# Patient Record
Sex: MALE | Race: WHITE | Employment: FULL TIME | URBAN - METROPOLITAN AREA
[De-identification: names, ages, dates, MRNs, and addresses within clinical notes are randomized per-mention and may not be internally consistent; named-entity substitution may affect disease eponyms.]

---

## 2017-04-08 ENCOUNTER — APPOINTMENT (OUTPATIENT)
Dept: GENERAL RADIOLOGY | Age: 62
End: 2017-04-08
Attending: EMERGENCY MEDICINE
Payer: COMMERCIAL

## 2017-04-08 ENCOUNTER — HOSPITAL ENCOUNTER (EMERGENCY)
Age: 62
Discharge: HOME | End: 2017-04-08
Attending: EMERGENCY MEDICINE | Admitting: EMERGENCY MEDICINE
Payer: COMMERCIAL

## 2017-04-08 VITALS
OXYGEN SATURATION: 91 % | DIASTOLIC BLOOD PRESSURE: 67 MMHG | SYSTOLIC BLOOD PRESSURE: 95 MMHG | HEIGHT: 71 IN | HEART RATE: 82 BPM | RESPIRATION RATE: 14 BRPM | WEIGHT: 200 LBS | BODY MASS INDEX: 28 KG/M2 | TEMPERATURE: 98 F

## 2017-04-08 DIAGNOSIS — M25.552 PAIN OF BOTH HIP JOINTS: Primary | ICD-10-CM

## 2017-04-08 DIAGNOSIS — M25.551 PAIN OF BOTH HIP JOINTS: Primary | ICD-10-CM

## 2017-04-08 PROCEDURE — 74011250636 HC RX REV CODE- 250/636: Performed by: EMERGENCY MEDICINE

## 2017-04-08 PROCEDURE — 96372 THER/PROPH/DIAG INJ SC/IM: CPT

## 2017-04-08 PROCEDURE — 72170 X-RAY EXAM OF PELVIS: CPT

## 2017-04-08 PROCEDURE — 99284 EMERGENCY DEPT VISIT MOD MDM: CPT

## 2017-04-08 RX ORDER — MORPHINE SULFATE 4 MG/ML
6 INJECTION, SOLUTION INTRAMUSCULAR; INTRAVENOUS
Status: COMPLETED | OUTPATIENT
Start: 2017-04-08 | End: 2017-04-08

## 2017-04-08 RX ORDER — NAPROXEN 500 MG/1
500 TABLET ORAL 2 TIMES DAILY WITH MEALS
Qty: 20 TAB | Refills: 0 | Status: SHIPPED | OUTPATIENT
Start: 2017-04-08 | End: 2017-04-18

## 2017-04-08 RX ORDER — HYDROCODONE BITARTRATE AND ACETAMINOPHEN 5; 325 MG/1; MG/1
1 TABLET ORAL
Qty: 8 TAB | Refills: 0 | Status: SHIPPED | OUTPATIENT
Start: 2017-04-08

## 2017-04-08 RX ADMIN — Medication 6 MG: at 01:43

## 2017-04-08 NOTE — ED TRIAGE NOTES
Pt says he started today to have pelvic and hip pain, feels like his legs are not working well. No known injury.

## 2017-04-08 NOTE — ED PROVIDER NOTES
HPI Comments: 64 y.o. male with past medical history significant for colon polyps who presents from home with chief complaint of hip pain. Pt complains of pelvic and tailbone soreness that started yesterday morning with increasing sharp bilateral hip pain that started yesterday afternoon. Pt states that he was unable to walk due to the pain in his hips by 4PM. Pt also complains of some lower back pain and chills secondary to pain. Pt reports that he used to have mild tailbone soreness in the past if he sits in metal chairs, but never pain of this severity. Pt states that he had surgery scheduled to remove \"a piece of his colon due to polyps\" twelve days ago, but his surgeon decided to not proceed with the surgery after a laparoscopy. Pt reports abdominal cramping and diarrhea over the past weekend. Pt's wife states that pt took Advil PM right before presenting to the ED. Pt denies any injury to his hips. Pt also denies any weakness, numbness, tingling or color change of his legs. Pt also denies any bowel movement changes, urinary symptoms, incontinence, fever, nausea, vomiting or diarrhea. Pt states that he does not have a history of arthritis. There are no other acute medical concerns at this time. PCP: Kalpesh Ramirez MD    Note written by Marie Christie, as dictated by Christopher Lewis. Momo Espinal MD 12:53 AM         The history is provided by the patient and the spouse. No  was used. History reviewed. No pertinent past medical history. Past Surgical History:   Procedure Laterality Date    HX GI      polyps         History reviewed. No pertinent family history. Social History     Social History    Marital status:      Spouse name: N/A    Number of children: N/A    Years of education: N/A     Occupational History    Not on file.      Social History Main Topics    Smoking status: Never Smoker    Smokeless tobacco: Not on file    Alcohol use No    Drug use: No    Sexual activity: Not on file     Other Topics Concern    Not on file     Social History Narrative    No narrative on file         ALLERGIES: Review of patient's allergies indicates no known allergies. Review of Systems   Constitutional: Positive for chills. Negative for fever. HENT: Negative for ear pain and sore throat. Eyes: Negative for pain. Respiratory: Negative for chest tightness and shortness of breath. Cardiovascular: Negative for chest pain and leg swelling. Gastrointestinal: Negative for constipation, nausea and vomiting. Abdominal pain: resolved. Diarrhea: resolved. Genitourinary: Negative for difficulty urinating, dysuria and flank pain. Musculoskeletal: Positive for arthralgias, back pain and gait problem. Skin: Negative for color change and rash. Neurological: Negative for weakness, numbness and headaches. All other systems reviewed and are negative. Vitals:    04/08/17 0021 04/08/17 0031   BP: 103/63    Pulse: 86    Resp: 20    Temp: 98 °F (36.7 °C)    SpO2: 94% 95%   Weight: 90.7 kg (200 lb)    Height: 5' 11\" (1.803 m)             Physical Exam   Constitutional: He appears well-developed and well-nourished. No distress. HENT:   Head: Normocephalic and atraumatic. Eyes: Pupils are equal, round, and reactive to light. No scleral icterus. Neck: Normal range of motion. Neck supple. No tracheal deviation present. Cardiovascular: Normal rate, regular rhythm and normal heart sounds. Exam reveals no gallop and no friction rub. No murmur heard. 2+ pedal pulses   Pulmonary/Chest: Effort normal and breath sounds normal. No respiratory distress. He has no wheezes. He has no rales. Abdominal: Soft. He exhibits no distension. There is no tenderness. There is no rebound and no guarding. Musculoskeletal: He exhibits no edema. Pain with internal rotation of bilateral hips. Neurological: He is alert. Normal motor and sensation of lower extremities.  Normal reflexes. Skin: Skin is warm and dry. Psychiatric: He has a normal mood and affect. Nursing note and vitals reviewed. Note written by Marie Walden, as dictated by Rahul Pritchard. Ayana Lee MD 12:53 AM        MDM  Number of Diagnoses or Management Options  Pain of both hip joints:   Diagnosis management comments: 64year old male presents with atraumatic bilateral hip pain. X-ray of pelvis unremarkable. Treated with morphine with complete relief. Able to ambulate in the ED without difficulty.     A/P: hip pain- f/u with PCP     Patient Progress  Patient progress: resolved    ED Course       Procedures

## 2017-04-08 NOTE — DISCHARGE INSTRUCTIONS
Hip Pain: Care Instructions  Your Care Instructions  Hip pain may be caused by many things, including overuse, a fall, or a twisting movement. Another cause of hip pain is arthritis. Your pain may increase when you stand up, walk, or squat. The pain may come and go or may be constant. Home treatment can help relieve hip pain, swelling, and stiffness. If your pain is ongoing, you may need more tests and treatment. Follow-up care is a key part of your treatment and safety. Be sure to make and go to all appointments, and call your doctor if you are having problems. Its also a good idea to know your test results and keep a list of the medicines you take. How can you care for yourself at home? · Take pain medicines exactly as directed. ¨ If the doctor gave you a prescription medicine for pain, take it as prescribed. ¨ If you are not taking a prescription pain medicine, ask your doctor if you can take an over-the-counter medicine. · Rest and protect your hip. Take a break from any activity, including standing or walking, that may cause pain. · Put ice or a cold pack against your hip for 10 to 20 minutes at a time. Try to do this every 1 to 2 hours for the next 3 days (when you are awake) or until the swelling goes down. Put a thin cloth between the ice and your skin. · Sleep on your healthy side with a pillow between your knees, or sleep on your back with pillows under your knees. · If there is no swelling, you can put moist heat, a heating pad, or a warm cloth on your hip. Do gentle stretching exercises to help keep your hip flexible. · Learn how to prevent falls. Have your vision and hearing checked regularly. Wear slippers or shoes with a nonskid sole. · Stay at a healthy weight. · Wear comfortable shoes. When should you call for help? Call 911 anytime you think you may need emergency care. For example, call if:  · You have sudden chest pain and shortness of breath, or you cough up blood.   · You are not able to stand or walk or bear weight. · Your buttocks, legs, or feet feel numb or tingly. · Your leg or foot is cool or pale or changes color. · You have severe pain. Call your doctor now or seek immediate medical care if:  · You have signs of infection, such as:  ¨ Increased pain, swelling, warmth, or redness in the hip area. ¨ Red streaks leading from the hip area. ¨ Pus draining from the hip area. ¨ A fever. · You have signs of a blood clot, such as:  ¨ Pain in your calf, back of the knee, thigh, or groin. ¨ Redness and swelling in your leg or groin. · You are not able to bend, straighten, or move your leg normally. · You have trouble urinating or having bowel movements. Watch closely for changes in your health, and be sure to contact your doctor if:  · You do not get better as expected. Where can you learn more? Go to http://kelli-sasha.info/. Enter X889 in the search box to learn more about \"Hip Pain: Care Instructions. \"  Current as of: May 27, 2016  Content Version: 11.2  © 7240-9234 [employer]. Care instructions adapted under license by [employer] (which disclaims liability or warranty for this information). If you have questions about a medical condition or this instruction, always ask your healthcare professional. [place] [address] any warranty or liability for your use of this information. We hope that we have addressed all of your medical concerns. The examination and treatment you received in the Emergency Department were for an emergent problem and were not intended as complete care. It is important that you follow up with your healthcare provider(s) for ongoing care. If your symptoms worsen or do not improve as expected, and you are unable to reach your usual health care provider(s), you should return to the Emergency Department.       Today's healthcare is undergoing tremendous change, and patient satisfaction surveys are one of the many tools to assess the quality of medical care. You may receive a survey from the [employer] regarding your experience in the Emergency Department. I hope that your experience has been completely positive, particularly the medical care that I provided. As such, please participate in the survey; anything less than excellent does not meet my expectations or intentions. [facility] and [facility] participate in nationally recognized quality of care measures. If your blood pressure is greater than 120/80, as reported below, we urge that you seek medical care to address the potential of high blood pressure, commonly known as hypertension. Hypertension can be hereditary or can be caused by certain medical conditions, pain, stress, or \"white coat syndrome. \"       Please make an appointment with your health care provider(s) for follow up of your Emergency Department visit. VITALS:   Patient Vitals for the past 8 hrs:   Temp Pulse Resp BP SpO2   04/08/17 0230 - - - 95/67 91 %   04/08/17 0145 - 82 14 110/71 93 %   04/08/17 0129 - - - - 92 %   04/08/17 0124 - - - - 92 %   04/08/17 0031 - - - - 95 %   04/08/17 0021 98 °F (36.7 °C) 86 20 103/63 94 %          Thank you for allowing us to provide you with medical care today. We realize that you have many choices for your emergency care needs. Please choose us in the future for any continued health care needs. Patti Segundo [place], [address] [facility]: [phone]            No results found for this or any previous visit (from the past 24 hour(s)). Xr Pelv Ap Only    Result Date: 4/8/2017  EXAM:  XR PELV AP ONLY INDICATION:  Pelvic and hip pain today. COMPARISON: None. TECHNIQUE: Frontal view of the pelvis. FINDINGS: There is no acute fracture or dislocation. The soft tissues are unremarkable. IMPRESSION: No acute process.

## 2022-04-05 ENCOUNTER — OFFICE VISIT (OUTPATIENT)
Dept: URBAN - METROPOLITAN AREA CLINIC 107 | Facility: CLINIC | Age: 67
End: 2022-04-05
Payer: MEDICARE

## 2022-04-05 ENCOUNTER — WEB ENCOUNTER (OUTPATIENT)
Dept: URBAN - METROPOLITAN AREA CLINIC 107 | Facility: CLINIC | Age: 67
End: 2022-04-05

## 2022-04-05 VITALS
WEIGHT: 194 LBS | BODY MASS INDEX: 27.16 KG/M2 | HEART RATE: 65 BPM | DIASTOLIC BLOOD PRESSURE: 76 MMHG | HEIGHT: 71 IN | SYSTOLIC BLOOD PRESSURE: 113 MMHG | RESPIRATION RATE: 18 BRPM | TEMPERATURE: 98 F

## 2022-04-05 DIAGNOSIS — R10.31 RLQ ABDOMINAL PAIN: ICD-10-CM

## 2022-04-05 PROCEDURE — 99204 OFFICE O/P NEW MOD 45 MIN: CPT | Performed by: INTERNAL MEDICINE

## 2022-04-05 RX ORDER — CHOLECALCIFEROL (VITAMIN D3) 50 MCG
1 TABLET TABLET ORAL ONCE A DAY
Status: ACTIVE | COMMUNITY

## 2022-04-05 RX ORDER — MULTIVITAMIN
1 TABLET TABLET ORAL ONCE A DAY
Status: ACTIVE | COMMUNITY

## 2022-04-05 NOTE — EXAM-PHYSICAL EXAM
He is alert and oriented to person place and situation no acute distress.  Neck is supple without adenopathy or thyromegaly.  Chest is clear to auscultation.  Heart reveals a regular rate and rhythm without gallops rubs or murmurs.  Abdomen is soft nondistended with some mild right lower quadrant tenderness and in this area he does have what appears to be palpable loops of bowel.  Extremities without clubbing cyanosis or edema.

## 2022-04-05 NOTE — HPI-TODAY'S VISIT:
The patient is a very pleasant 66-year-old gentleman referred.  He was referred for multiple gastrointestinal complaints.  I reviewed medical records sent by referring physician. The patient states he comes in now with a postoperative problem.  The patient states that for many years he has had a recurrent polyp and what sounds like his cecum that could never be eradicated.  It evidently grew to a point that it required a right hemicolectomy that was done in October 2021.  He states since the operation he has developed a problem that has been slowly progressive of postprandial abdominal pain primarily that starts in the right lower quadrant and then moves across the lower abdomen.  It usually resolves overnight and then resumes the next morning when he eats again.  His bowel movements are normal.  There is been no weight loss.  There is no nausea or vomiting.  He states he has had multiple imaging studies locally at the urology center in regards to a large kidney stone that required lithotripsy.  He states the urologist told him he had a "old liver" on CT scan.  The patient states there is no blood in his stool.  He states he is scheduled for colonoscopy next week in [facility] by his primary gastroenterologist there.  He states he is also scheduled to see his colorectal surgeon next week for routine postop follow-up.  There is an extensive family history of colon polyps in that his daughter has had a polyposis syndrome and required partial colectomy.

## 2022-04-19 ENCOUNTER — OFFICE VISIT (OUTPATIENT)
Dept: URBAN - METROPOLITAN AREA CLINIC 113 | Facility: CLINIC | Age: 67
End: 2022-04-19
Payer: MEDICARE

## 2022-04-19 VITALS
HEIGHT: 71 IN | WEIGHT: 194 LBS | HEART RATE: 61 BPM | TEMPERATURE: 97.7 F | BODY MASS INDEX: 27.16 KG/M2 | SYSTOLIC BLOOD PRESSURE: 113 MMHG | DIASTOLIC BLOOD PRESSURE: 73 MMHG

## 2022-04-19 DIAGNOSIS — Z86.010 HISTORY OF COLON POLYPS: ICD-10-CM

## 2022-04-19 DIAGNOSIS — R10.31 RLQ ABDOMINAL PAIN: ICD-10-CM

## 2022-04-19 PROBLEM — 428283002: Status: ACTIVE | Noted: 2022-04-19

## 2022-04-19 PROCEDURE — 99213 OFFICE O/P EST LOW 20 MIN: CPT | Performed by: INTERNAL MEDICINE

## 2022-04-19 RX ORDER — CHOLECALCIFEROL (VITAMIN D3) 50 MCG
1 TABLET TABLET ORAL ONCE A DAY
Status: ACTIVE | COMMUNITY

## 2022-04-19 RX ORDER — MULTIVITAMIN
1 TABLET TABLET ORAL ONCE A DAY
Status: ACTIVE | COMMUNITY

## 2022-04-19 NOTE — EXAM-PHYSICAL EXAM
He is alert and oriented to person place and situation no acute distress.  Abdomen is soft nondistended with some minimal right lower quadrant tenderness to deep palpation.  No significant hernias are noted.

## 2022-04-19 NOTE — HPI-TODAY'S VISIT:
The patient is a very pleasant 66-year-old gentleman referred.  He was referred for multiple gastrointestinal complaints.  I reviewed medical records sent by referring physician. The patient states he comes in now with a postoperative problem.  The patient states that for many years he has had a recurrent polyp and what sounds like his cecum that could never be eradicated.  It evidently grew to a point that it required a right hemicolectomy that was done in October 2021.  He states since the operation he has developed a problem that has been slowly progressive of postprandial abdominal pain primarily that starts in the right lower quadrant and then moves across the lower abdomen.  It usually resolves overnight and then resumes the next morning when he eats again.  His bowel movements are normal.  There is been no weight loss.  There is no nausea or vomiting.  He states he has had multiple imaging studies locally at the urology center in regards to a large kidney stone that required lithotripsy.  He states the urologist told him he had a "old liver" on CT scan.  The patient states there is no blood in his stool.  He states he is scheduled for colonoscopy next week in [facility] by his primary gastroenterologist there.  He states he is also scheduled to see his colorectal surgeon next week for routine postop follow-up.  There is an extensive family history of colon polyps in that his daughter has had a polyposis syndrome and required partial colectomy. Interval history.  I reviewed colonoscopy report from outside physician performed April 11.  This was an unremarkable exam.  Anastomosis was intact and patent according to the colonoscopy report.  Diverticulosis was seen in the sigmoid colon. I also reviewed extensive op and path reports sent by the patient.  Colonoscopy in 2017 for unresectable polyp did not reveal any polyp at that time.  Repeat colonoscopy in April 2018 revealed a flat polyp in the ascending colon removed via snare polypectomy.  Repeat colonoscopy in 2019 once again revealed a recurring polyp in the ascending colon.  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  Patient had repeat colonoscopy in 2020.  Once again revealed a polyp in the ascending colon that was once again removed.  This time it returned as a villous adenoma with no high-grade dysplasia.  Patient had another colonoscopy in April 2021.  At the ileocecal valve there was a 3.65 cm flat polyp felt to be too big to remove.  Biopsies returned as tubular adenomas.  In October 2021 patient underwent ileocecectomy. The patient states his symptoms of postprandial fullness and discomfort are steadily getting better.  His weight has stabilized.  His right lower quadrant pain is steadily improving.

## 2023-04-28 ENCOUNTER — TELEPHONE ENCOUNTER (OUTPATIENT)
Dept: URBAN - METROPOLITAN AREA CLINIC 113 | Facility: CLINIC | Age: 68
End: 2023-04-28

## 2023-04-28 ENCOUNTER — OFFICE VISIT (OUTPATIENT)
Dept: URBAN - METROPOLITAN AREA CLINIC 107 | Facility: CLINIC | Age: 68
End: 2023-04-28
Payer: MEDICARE

## 2023-04-28 VITALS
HEIGHT: 71 IN | TEMPERATURE: 96.4 F | SYSTOLIC BLOOD PRESSURE: 99 MMHG | HEART RATE: 70 BPM | RESPIRATION RATE: 18 BRPM | WEIGHT: 203 LBS | BODY MASS INDEX: 28.42 KG/M2 | DIASTOLIC BLOOD PRESSURE: 67 MMHG

## 2023-04-28 DIAGNOSIS — R10.31 RLQ ABDOMINAL PAIN: ICD-10-CM

## 2023-04-28 DIAGNOSIS — Z90.49 HISTORY OF RIGHT HEMICOLECTOMY: ICD-10-CM

## 2023-04-28 DIAGNOSIS — R10.9 ABDOMINAL DISCOMFORT: ICD-10-CM

## 2023-04-28 DIAGNOSIS — Z86.010 HISTORY OF COLON POLYPS: ICD-10-CM

## 2023-04-28 PROBLEM — 428305005: Status: ACTIVE | Noted: 2023-04-28

## 2023-04-28 PROCEDURE — 99214 OFFICE O/P EST MOD 30 MIN: CPT

## 2023-04-28 RX ORDER — CHOLECALCIFEROL (VITAMIN D3) 50 MCG
1 TABLET TABLET ORAL ONCE A DAY
Status: ACTIVE | COMMUNITY

## 2023-04-28 RX ORDER — MULTIVITAMIN
1 TABLET TABLET ORAL ONCE A DAY
Status: ACTIVE | COMMUNITY

## 2023-04-28 NOTE — HPI-TODAY'S VISIT:
The patient is a very pleasant 66-year-old gentleman referred.  He was referred for multiple gastrointestinal complaints.  I reviewed medical records sent by referring physician. The patient states he comes in now with a postoperative problem.  The patient states that for many years he has had a recurrent polyp and what sounds like his cecum that could never be eradicated.  It evidently grew to a point that it required a right hemicolectomy that was done in October 2021.  He states since the operation he has developed a problem that has been slowly progressive of postprandial abdominal pain primarily that starts in the right lower quadrant and then moves across the lower abdomen.  It usually resolves overnight and then resumes the next morning when he eats again.  His bowel movements are normal.  There is been no weight loss.  There is no nausea or vomiting.  He states he has had multiple imaging studies locally at the urology center in regards to a large kidney stone that required lithotripsy.  He states the urologist told him he had a "old liver" on CT scan.  The patient states there is no blood in his stool.  He states he is scheduled for colonoscopy next week in [facility] by his primary gastroenterologist there.  He states he is also scheduled to see his colorectal surgeon next week for routine postop follow-up.  There is an extensive family history of colon polyps in that his daughter has had a polyposis syndrome and required partial colectomy. Interval history.  I reviewed colonoscopy report from outside physician performed April 11.  This was an unremarkable exam.  Anastomosis was intact and patent according to the colonoscopy report.  Diverticulosis was seen in the sigmoid colon. I also reviewed extensive op and path reports sent by the patient.  Colonoscopy in 2017 for unresectable polyp did not reveal any polyp at that time.  Repeat colonoscopy in April 2018 revealed a flat polyp in the ascending colon removed via snare polypectomy.  Repeat colonoscopy in 2019 once again revealed a recurring polyp in the ascending colon.  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  Patient had repeat colonoscopy in 2020.  Once again revealed a polyp in the ascending colon that was once again removed.  This time it returned as a villous adenoma with no high-grade dysplasia.  Patient had another colonoscopy in April 2021.  At the ileocecal valve there was a 3.65 cm flat polyp felt to be too big to remove.  Biopsies returned as tubular adenomas.  In October 2021 patient underwent ileocecectomy. The patient states his symptoms of postprandial fullness and discomfort are steadily getting better.  His weight has stabilized.  His right lower quadrant pain is steadily improving.  Interval history, 4/28/2023: 66-year-old male presents for long interval follow-up.  He was last seen on 4/19/2022 for right lower quadrant pain with postprandial abdominal pain which was steadily improving.  Etiology was unclear though question hernia or adhesions.  As he was improving a CT enterography was deferred.  He was planning to move to [place] at the end of that week.  He is on a 3-year surveillance interval for history of colon polyps per his prior endoscopist recommendations.  He states he was doing quite well for a while. He was asymptomatic for 3 months then around then holidays the symptoms returned. He cannot recall any other inciting factors or preceding events. He describes this as generalized burning of his abdomen and lower abdominal/RLQ pain. The pain is dull. Symptoms do worsen when doing yard work of vacuuming. He has relief when lying flat. The symptoms usually start after eating breakfast. He feels bloated but does not necessarily see bloating. He feels an upper abdominal pressure. He has noticed small bulges near his surgical incisions.His wife believes he has hernias.  Bowel movements are regular and without blood. Denies fevers or chills. He does have infrequent nausea without vomiting.  Denies unintentional weight loss. He is otherwise doing well, He is leaving back to [place] in a month. He and his wife spend the summers there.

## 2023-04-28 NOTE — PHYSICAL EXAM GASTROINTESTINAL
soft, nontender, nondistended , normal bowel sounds, mild right sided asymmetry of abdomen noted, Trocar sites noted without hernias

## 2023-05-22 ENCOUNTER — OFFICE VISIT (OUTPATIENT)
Dept: URBAN - METROPOLITAN AREA CLINIC 113 | Facility: CLINIC | Age: 68
End: 2023-05-22
Payer: MEDICARE

## 2023-05-22 VITALS
HEART RATE: 68 BPM | SYSTOLIC BLOOD PRESSURE: 121 MMHG | HEIGHT: 71 IN | WEIGHT: 203 LBS | DIASTOLIC BLOOD PRESSURE: 82 MMHG | TEMPERATURE: 97.8 F | BODY MASS INDEX: 28.42 KG/M2 | RESPIRATION RATE: 16 BRPM

## 2023-05-22 DIAGNOSIS — N13.5 UPJ (URETEROPELVIC JUNCTION) OBSTRUCTION: ICD-10-CM

## 2023-05-22 DIAGNOSIS — Z90.49 HISTORY OF RIGHT HEMICOLECTOMY: ICD-10-CM

## 2023-05-22 DIAGNOSIS — R10.31 RLQ ABDOMINAL PAIN: ICD-10-CM

## 2023-05-22 DIAGNOSIS — R10.9 ABDOMINAL DISCOMFORT: ICD-10-CM

## 2023-05-22 DIAGNOSIS — Z86.010 HISTORY OF COLON POLYPS: ICD-10-CM

## 2023-05-22 PROCEDURE — 99214 OFFICE O/P EST MOD 30 MIN: CPT

## 2023-05-22 RX ORDER — AMOXICILLIN AND CLAVULANATE POTASSIUM 875; 125 MG/1; MG/1
1 TABLET TABLET, FILM COATED ORAL
Qty: 20 | Refills: 0 | OUTPATIENT
Start: 2023-05-22 | End: 2023-06-01

## 2023-05-22 RX ORDER — MULTIVITAMIN
1 TABLET TABLET ORAL ONCE A DAY
Status: ACTIVE | COMMUNITY

## 2023-05-22 RX ORDER — CHOLECALCIFEROL (VITAMIN D3) 50 MCG
1 TABLET TABLET ORAL ONCE A DAY
Status: ACTIVE | COMMUNITY

## 2023-05-22 NOTE — HPI-TODAY'S VISIT:
The patient is a very pleasant 66-year-old gentleman referred.  He was referred for multiple gastrointestinal complaints.  I reviewed medical records sent by referring physician. The patient states he comes in now with a postoperative problem.  The patient states that for many years he has had a recurrent polyp and what sounds like his cecum that could never be eradicated.  It evidently grew to a point that it required a right hemicolectomy that was done in October 2021.  He states since the operation he has developed a problem that has been slowly progressive of postprandial abdominal pain primarily that starts in the right lower quadrant and then moves across the lower abdomen.  It usually resolves overnight and then resumes the next morning when he eats again.  His bowel movements are normal.  There is been no weight loss.  There is no nausea or vomiting.  He states he has had multiple imaging studies locally at the urology center in regards to a large kidney stone that required lithotripsy.  He states the urologist told him he had a "old liver" on CT scan.  The patient states there is no blood in his stool.  He states he is scheduled for colonoscopy next week in [facility] by his primary gastroenterologist there.  He states he is also scheduled to see his colorectal surgeon next week for routine postop follow-up.  There is an extensive family history of colon polyps in that his daughter has had a polyposis syndrome and required partial colectomy. Interval history.  I reviewed colonoscopy report from outside physician performed April 11.  This was an unremarkable exam.  Anastomosis was intact and patent according to the colonoscopy report.  Diverticulosis was seen in the sigmoid colon. I also reviewed extensive op and path reports sent by the patient.  Colonoscopy in 2017 for unresectable polyp did not reveal any polyp at that time.  Repeat colonoscopy in April 2018 revealed a flat polyp in the ascending colon removed via snare polypectomy.  Repeat colonoscopy in 2019 once again revealed a recurring polyp in the ascending colon.  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  Patient had repeat colonoscopy in 2020.  Once again revealed a polyp in the ascending colon that was once again removed.  This time it returned as a villous adenoma with no high-grade dysplasia.  Patient had another colonoscopy in April 2021.  At the ileocecal valve there was a 3.65 cm flat polyp felt to be too big to remove.  Biopsies returned as tubular adenomas.  In October 2021 patient underwent ileocecectomy. The patient states his symptoms of postprandial fullness and discomfort are steadily getting better.  His weight has stabilized.  His right lower quadrant pain is steadily improving.  Interval history, 4/28/2023: 66-year-old male presents for long interval follow-up.  He was last seen on 4/19/2022 for right lower quadrant pain with postprandial abdominal pain which was steadily improving.  Etiology was unclear though question hernia or adhesions.  As he was improving a CT enterography was deferred.  He was planning to move to [place] at the end of that week.  He is on a 3-year surveillance interval for history of colon polyps per his prior endoscopist recommendations.  He states he was doing quite well for a while. He was asymptomatic for 3 months then around then holidays the symptoms returned. He cannot recall any other inciting factors or preceding events. He describes this as generalized burning of his abdomen and lower abdominal/RLQ pain. The pain is dull. Symptoms do worsen when doing yard work of vacuuming. He has relief when lying flat. The symptoms usually start after eating breakfast. He feels bloated but does not necessarily see bloating. He feels an upper abdominal pressure. He has noticed small bulges near his surgical incisions.His wife believes he has hernias.  Bowel movements are regular and without blood. Denies fevers or chills. He does have infrequent nausea without vomiting.  Denies unintentional weight loss. He is otherwise doing well, He is leaving back to [place] in a month. He and his wife spend the summers there.  Interval history, 5/22/2023: 67-year-old male presents for follow-up.  He was last seen on 4/28/2023.  He reported recurrence of right lower quadrant pain with postprandial abdominal pain and pressure.  This has been ongoing since his lithotripsy and right hemicolectomy.  Unclear etiology though could be surgical adhesions versus hernias.  CT enterography was recommended in the past pending recurrence of symptoms.  If this is negative, may consider trial of antibiotics for empiric treatment of SIBO given abdominal bloating sensation.  CT enterography 5/1/2023:No small bowel inflammation or mass identified.  Dilatation of the right renal pelvis and calyces which transitions at the UPJ concerning for UPJ obstruction.  At the level of transition there is a fatty lesion measuring two-point centimeters which may reflect prior procedural changes and subsequent fat necrosis.  If there is no history of prior right renal procedure, this could reflect an angiomyolipoma although felt less likely.  Portal vein occlusion with cavernous transformation.  It was question whether the kidney findings could be causing his right-sided abdominal pain.  He was recommended to address this with his urologist or PCP.  Patient has a scheduled appointment in August.  Sooner appointments were offered that he declined as he will be out of town.  He has no urinary complaints at this time. He states the pain has slightly improved over the last week. He still cannot pinpoint a trigger though it does worsen after eating. He again states he will feel bloated but not necessarily see bloating. His incisional sites can be painful to the touch. Bowel movements remain regular and without blood.

## 2023-08-18 ENCOUNTER — OFFICE VISIT (OUTPATIENT)
Dept: URBAN - METROPOLITAN AREA CLINIC 107 | Facility: CLINIC | Age: 68
End: 2023-08-18
Payer: MEDICARE

## 2023-08-18 VITALS
TEMPERATURE: 97.1 F | DIASTOLIC BLOOD PRESSURE: 65 MMHG | HEIGHT: 71 IN | RESPIRATION RATE: 18 BRPM | HEART RATE: 67 BPM | SYSTOLIC BLOOD PRESSURE: 96 MMHG | WEIGHT: 200 LBS | BODY MASS INDEX: 28 KG/M2

## 2023-08-18 DIAGNOSIS — R10.31 RLQ ABDOMINAL PAIN: ICD-10-CM

## 2023-08-18 DIAGNOSIS — Z86.010 HISTORY OF COLON POLYPS: ICD-10-CM

## 2023-08-18 DIAGNOSIS — Z90.49 HISTORY OF RIGHT HEMICOLECTOMY: ICD-10-CM

## 2023-08-18 DIAGNOSIS — N13.5 UPJ (URETEROPELVIC JUNCTION) OBSTRUCTION: ICD-10-CM

## 2023-08-18 DIAGNOSIS — R10.9 ABDOMINAL DISCOMFORT: ICD-10-CM

## 2023-08-18 PROCEDURE — 99214 OFFICE O/P EST MOD 30 MIN: CPT

## 2023-08-18 RX ORDER — CHOLECALCIFEROL (VITAMIN D3) 50 MCG
1 TABLET TABLET ORAL ONCE A DAY
Status: ACTIVE | COMMUNITY

## 2023-08-18 RX ORDER — MULTIVITAMIN
1 TABLET TABLET ORAL ONCE A DAY
Status: ACTIVE | COMMUNITY

## 2023-08-18 NOTE — HPI-TODAY'S VISIT:
The patient is a very pleasant 66-year-old gentleman referred.  He was referred for multiple gastrointestinal complaints.  I reviewed medical records sent by referring physician. The patient states he comes in now with a postoperative problem.  The patient states that for many years he has had a recurrent polyp and what sounds like his cecum that could never be eradicated.  It evidently grew to a point that it required a right hemicolectomy that was done in October 2021.  He states since the operation he has developed a problem that has been slowly progressive of postprandial abdominal pain primarily that starts in the right lower quadrant and then moves across the lower abdomen.  It usually resolves overnight and then resumes the next morning when he eats again.  His bowel movements are normal.  There is been no weight loss.  There is no nausea or vomiting.  He states he has had multiple imaging studies locally at the urology center in regards to a large kidney stone that required lithotripsy.  He states the urologist told him he had a "old liver" on CT scan.  The patient states there is no blood in his stool.  He states he is scheduled for colonoscopy next week in [facility] by his primary gastroenterologist there.  He states he is also scheduled to see his colorectal surgeon next week for routine postop follow-up.  There is an extensive family history of colon polyps in that his daughter has had a polyposis syndrome and required partial colectomy. Interval history.  I reviewed colonoscopy report from outside physician performed April 11.  This was an unremarkable exam.  Anastomosis was intact and patent according to the colonoscopy report.  Diverticulosis was seen in the sigmoid colon. I also reviewed extensive op and path reports sent by the patient.  Colonoscopy in 2017 for unresectable polyp did not reveal any polyp at that time.  Repeat colonoscopy in April 2018 revealed a flat polyp in the ascending colon removed via snare polypectomy.  Repeat colonoscopy in 2019 once again revealed a recurring polyp in the ascending colon.  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  Patient had repeat colonoscopy in 2020.  Once again revealed a polyp in the ascending colon that was once again removed.  This time it returned as a villous adenoma with no high-grade dysplasia.  Patient had another colonoscopy in April 2021.  At the ileocecal valve there was a 3.65 cm flat polyp felt to be too big to remove.  Biopsies returned as tubular adenomas.  In October 2021 patient underwent ileocecectomy. The patient states his symptoms of postprandial fullness and discomfort are steadily getting better.  His weight has stabilized.  His right lower quadrant pain is steadily improving.  Interval history, 4/28/2023: 66-year-old male presents for long interval follow-up.  He was last seen on 4/19/2022 for right lower quadrant pain with postprandial abdominal pain which was steadily improving.  Etiology was unclear though question hernia or adhesions.  As he was improving a CT enterography was deferred.  He was planning to move to [place] at the end of that week.  He is on a 3-year surveillance interval for history of colon polyps per his prior endoscopist recommendations.  He states he was doing quite well for a while. He was asymptomatic for 3 months then around then holidays the symptoms returned. He cannot recall any other inciting factors or preceding events. He describes this as generalized burning of his abdomen and lower abdominal/RLQ pain. The pain is dull. Symptoms do worsen when doing yard work of vacuuming. He has relief when lying flat. The symptoms usually start after eating breakfast. He feels bloated but does not necessarily see bloating. He feels an upper abdominal pressure. He has noticed small bulges near his surgical incisions.His wife believes he has hernias.  Bowel movements are regular and without blood. Denies fevers or chills. He does have infrequent nausea without vomiting.  Denies unintentional weight loss. He is otherwise doing well, He is leaving back to [place] in a month. He and his wife spend the summers there.  Interval history, 5/22/2023: 67-year-old male presents for follow-up.  He was last seen on 4/28/2023.  He reported recurrence of right lower quadrant pain with postprandial abdominal pain and pressure.  This has been ongoing since his lithotripsy and right hemicolectomy.  Unclear etiology though could be surgical adhesions versus hernias.  CT enterography was recommended in the past pending recurrence of symptoms.  If this is negative, may consider trial of antibiotics for empiric treatment of SIBO given abdominal bloating sensation.  CT enterography 5/1/2023:No small bowel inflammation or mass identified.  Dilatation of the right renal pelvis and calyces which transitions at the UPJ concerning for UPJ obstruction.  At the level of transition there is a fatty lesion measuring two-point centimeters which may reflect prior procedural changes and subsequent fat necrosis.  If there is no history of prior right renal procedure, this could reflect an angiomyolipoma although felt less likely.  Portal vein occlusion with cavernous transformation.  It was question whether the kidney findings could be causing his right-sided abdominal pain.  He was recommended to address this with his urologist or PCP.  Patient has a scheduled appointment in August.  Sooner appointments were offered that he declined as he will be out of town.  He has no urinary complaints at this time. He states the pain has slightly improved over the last week. He still cannot pinpoint a trigger though it does worsen after eating. He again states he will feel bloated but not necessarily see bloating. His incisional sites can be painful to the touch. Bowel movements remain regular and without blood.  Interval history, 8/18/2023: 67-year-old male presents for follow-up.  He was last seen on 5/22/2023.  He was provided a short course of Augmentin for possible SIBO.  He was provided strict ER precautions in the interim as he was leaving town.  He states his symptoms resolved for an entire month after his short course of Augmentin. He was very pleased. Then the discomfort returned. He feels it is now chronic. 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 He continues to have no urinary symptoms. Bowel movements are regular and without blood.  He does use an inverter to stretch. He feels this relieves his pain for about 30 mins to an hour as well.

## 2024-02-29 ENCOUNTER — OV EP (OUTPATIENT)
Dept: URBAN - METROPOLITAN AREA CLINIC 107 | Facility: CLINIC | Age: 69
End: 2024-02-29

## 2024-02-29 ENCOUNTER — LAB (OUTPATIENT)
Dept: URBAN - METROPOLITAN AREA CLINIC 107 | Facility: CLINIC | Age: 69
End: 2024-02-29

## 2024-02-29 VITALS
BODY MASS INDEX: 27.44 KG/M2 | HEIGHT: 71 IN | HEART RATE: 80 BPM | DIASTOLIC BLOOD PRESSURE: 80 MMHG | TEMPERATURE: 98.2 F | RESPIRATION RATE: 18 BRPM | SYSTOLIC BLOOD PRESSURE: 110 MMHG | WEIGHT: 196 LBS

## 2024-02-29 RX ORDER — CHOLECALCIFEROL (VITAMIN D3) 50 MCG
1 TABLET TABLET ORAL ONCE A DAY
Status: ACTIVE | COMMUNITY

## 2024-02-29 RX ORDER — MULTIVITAMIN
1 TABLET TABLET ORAL ONCE A DAY
Status: ACTIVE | COMMUNITY

## 2024-02-29 NOTE — EXAM-PHYSICAL EXAM
He is alert and oriented to person place and situation no acute distress.  There is no scleral icterus.  I do not appreciate a hernia in his left inguinal region.  There is no tenderness or bulge in this area.

## 2024-02-29 NOTE — HPI-TODAY'S VISIT:
The patient is a very pleasant 66-year-old gentleman referred.  He was referred for multiple gastrointestinal complaints.  I reviewed medical records sent by referring physician. The patient states he comes in now with a postoperative problem.  The patient states that for many years he has had a recurrent polyp and what sounds like his cecum that could never be eradicated.  It evidently grew to a point that it required a right hemicolectomy that was done in October 2021.  He states since the operation he has developed a problem that has been slowly progressive of postprandial abdominal pain primarily that starts in the right lower quadrant and then moves across the lower abdomen.  It usually resolves overnight and then resumes the next morning when he eats again.  His bowel movements are normal.  There is been no weight loss.  There is no nausea or vomiting.  He states he has had multiple imaging studies locally at the urology center in regards to a large kidney stone that required lithotripsy.  He states the urologist told him he had a "old liver" on CT scan.  The patient states there is no blood in his stool.  He states he is scheduled for colonoscopy next week in [facility] by his primary gastroenterologist there.  He states he is also scheduled to see his colorectal surgeon next week for routine postop follow-up.  There is an extensive family history of colon polyps in that his daughter has had a polyposis syndrome and required partial colectomy. Interval history.  I reviewed colonoscopy report from outside physician performed April 11.  This was an unremarkable exam.  Anastomosis was intact and patent according to the colonoscopy report.  Diverticulosis was seen in the sigmoid colon. I also reviewed extensive op and path reports sent by the patient.  Colonoscopy in 2017 for unresectable polyp did not reveal any polyp at that time.  Repeat colonoscopy in April 2018 revealed a flat polyp in the ascending colon removed via snare polypectomy.  Repeat colonoscopy in 2019 once again revealed a recurring polyp in the ascending colon.  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  Patient had repeat colonoscopy in 2020.  Once again revealed a polyp in the ascending colon that was once again removed.  This time it returned as a villous adenoma with no high-grade dysplasia.  Patient had another colonoscopy in April 2021.  At the ileocecal valve there was a 3.65 cm flat polyp felt to be too big to remove.  Biopsies returned as tubular adenomas.  In October 2021 patient underwent ileocecectomy. The patient states his symptoms of postprandial fullness and discomfort are steadily getting better.  His weight has stabilized.  His right lower quadrant pain is steadily improving.  Interval history, 4/28/2023: 66-year-old male presents for long interval follow-up.  He was last seen on 4/19/2022 for right lower quadrant pain with postprandial abdominal pain which was steadily improving.  Etiology was unclear though question hernia or adhesions.  As he was improving a CT enterography was deferred.  He was planning to move to [place] at the end of that week.  He is on a 3-year surveillance interval for history of colon polyps per his prior endoscopist recommendations.  He states he was doing quite well for a while. He was asymptomatic for 3 months then around then holidays the symptoms returned. He cannot recall any other inciting factors or preceding events. He describes this as generalized burning of his abdomen and lower abdominal/RLQ pain. The pain is dull. Symptoms do worsen when doing yard work of vacuuming. He has relief when lying flat. The symptoms usually start after eating breakfast. He feels bloated but does not necessarily see bloating. He feels an upper abdominal pressure. He has noticed small bulges near his surgical incisions.His wife believes he has hernias.  Bowel movements are regular and without blood. Denies fevers or chills. He does have infrequent nausea without vomiting.  Denies unintentional weight loss. He is otherwise doing well, He is leaving back to [place] in a month. He and his wife spend the summers there.  Interval history, 5/22/2023: 67-year-old male presents for follow-up.  He was last seen on 4/28/2023.  He reported recurrence of right lower quadrant pain with postprandial abdominal pain and pressure.  This has been ongoing since his lithotripsy and right hemicolectomy.  Unclear etiology though could be surgical adhesions versus hernias.  CT enterography was recommended in the past pending recurrence of symptoms.  If this is negative, may consider trial of antibiotics for empiric treatment of SIBO given abdominal bloating sensation.  CT enterography 5/1/2023:No small bowel inflammation or mass identified.  Dilatation of the right renal pelvis and calyces which transitions at the UPJ concerning for UPJ obstruction.  At the level of transition there is a fatty lesion measuring two-point centimeters which may reflect prior procedural changes and subsequent fat necrosis.  If there is no history of prior right renal procedure, this could reflect an angiomyolipoma although felt less likely.  Portal vein occlusion with cavernous transformation.  It was question whether the kidney findings could be causing his right-sided abdominal pain.  He was recommended to address this with his urologist or PCP.  Patient has a scheduled appointment in August.  Sooner appointments were offered that he declined as he will be out of town.  He has no urinary complaints at this time. He states the pain has slightly improved over the last week. He still cannot pinpoint a trigger though it does worsen after eating. He again states he will feel bloated but not necessarily see bloating. His incisional sites can be painful to the touch. Bowel movements remain regular and without blood.  Interval history, 8/18/2023: 67-year-old male presents for follow-up.  He was last seen on 5/22/2023.  He was provided a short course of Augmentin for possible SIBO.  He was provided strict ER precautions in the interim as he was leaving town.  He states his symptoms resolved for an entire month after his short course of Augmentin. He was very pleased. Then the discomfort returned. He feels it is now chronic. 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 He continues to have no urinary symptoms. Bowel movements are regular and without blood.  He does use an inverter to stretch. He feels this relieves his pain for about 30 mins to an hour as well. Interval history, 2/29/2024.  Referring records were reviewed.  Laboratory testing from January 31, 2024 revealed a normal CBC except for low platelets of 147.  CMP revealed a sodium of 145 otherwise normal CMP.  PSA was 4.0 vitamin B12 was 522. The patient states his only complaint since I last saw him has been some left inguinal pressure with heavy lifting.  In addition he has had more belching than usual.

## 2024-04-16 ENCOUNTER — LAB (OUTPATIENT)
Dept: URBAN - METROPOLITAN AREA CLINIC 4 | Facility: CLINIC | Age: 69
End: 2024-04-16
Payer: MEDICARE

## 2024-04-16 ENCOUNTER — COLON (OUTPATIENT)
Dept: URBAN - METROPOLITAN AREA SURGERY CENTER 25 | Facility: SURGERY CENTER | Age: 69
End: 2024-04-16
Payer: MEDICARE

## 2024-04-16 DIAGNOSIS — Z09 ENCOUNTER FOR FOLLOW-UP EXAMINATION AFTER COMPLETED TREATMENT FOR CONDITIONS OTHER THAN MALIGNANT NEOPLASM: ICD-10-CM

## 2024-04-16 DIAGNOSIS — D12.4 BENIGN NEOPLASM OF DESCENDING COLON: ICD-10-CM

## 2024-04-16 DIAGNOSIS — Z86.010 PERSONAL HISTORY OF COLONIC POLYPS: ICD-10-CM

## 2024-04-16 DIAGNOSIS — Z98.0 INTESTINAL BYPASS OR ANASTOMOSIS STATUS: ICD-10-CM

## 2024-04-16 PROCEDURE — 88305 TISSUE EXAM BY PATHOLOGIST: CPT | Performed by: PATHOLOGY

## 2024-04-16 PROCEDURE — 45385 COLONOSCOPY W/LESION REMOVAL: CPT | Performed by: INTERNAL MEDICINE

## 2024-04-16 RX ORDER — CHOLECALCIFEROL (VITAMIN D3) 50 MCG
1 TABLET TABLET ORAL ONCE A DAY
Status: ACTIVE | COMMUNITY

## 2024-04-16 RX ORDER — MULTIVITAMIN
1 TABLET TABLET ORAL ONCE A DAY
Status: ACTIVE | COMMUNITY

## 2024-05-17 ENCOUNTER — DASHBOARD ENCOUNTERS (OUTPATIENT)
Age: 69
End: 2024-05-17

## 2024-05-17 ENCOUNTER — OFFICE VISIT (OUTPATIENT)
Dept: URBAN - METROPOLITAN AREA CLINIC 107 | Facility: CLINIC | Age: 69
End: 2024-05-17
Payer: MEDICARE

## 2024-05-17 VITALS
BODY MASS INDEX: 27.8 KG/M2 | DIASTOLIC BLOOD PRESSURE: 78 MMHG | HEART RATE: 65 BPM | SYSTOLIC BLOOD PRESSURE: 115 MMHG | TEMPERATURE: 97.7 F | HEIGHT: 71 IN | WEIGHT: 198.6 LBS

## 2024-05-17 DIAGNOSIS — R10.32 LEFT INGUINAL PAIN: ICD-10-CM

## 2024-05-17 DIAGNOSIS — Z86.010 HISTORY OF COLON POLYPS: ICD-10-CM

## 2024-05-17 DIAGNOSIS — R10.31 RLQ ABDOMINAL PAIN: ICD-10-CM

## 2024-05-17 DIAGNOSIS — Z90.49 HISTORY OF RIGHT HEMICOLECTOMY: ICD-10-CM

## 2024-05-17 PROCEDURE — 99212 OFFICE O/P EST SF 10 MIN: CPT

## 2024-05-17 RX ORDER — CHOLECALCIFEROL (VITAMIN D3) 50 MCG
1 TABLET TABLET ORAL ONCE A DAY
Status: ACTIVE | COMMUNITY

## 2024-05-17 RX ORDER — MULTIVITAMIN
1 TABLET TABLET ORAL ONCE A DAY
Status: ACTIVE | COMMUNITY

## 2025-04-14 ENCOUNTER — TELEPHONE ENCOUNTER (OUTPATIENT)
Dept: URBAN - METROPOLITAN AREA CLINIC 107 | Facility: CLINIC | Age: 70
End: 2025-04-14